# Patient Record
Sex: MALE | Race: BLACK OR AFRICAN AMERICAN | NOT HISPANIC OR LATINO | URBAN - METROPOLITAN AREA
[De-identification: names, ages, dates, MRNs, and addresses within clinical notes are randomized per-mention and may not be internally consistent; named-entity substitution may affect disease eponyms.]

---

## 2020-03-27 ENCOUNTER — EMERGENCY (EMERGENCY)
Facility: HOSPITAL | Age: 35
LOS: 1 days | Discharge: HOME | End: 2020-03-27
Payer: OTHER GOVERNMENT

## 2020-03-27 VITALS
SYSTOLIC BLOOD PRESSURE: 121 MMHG | OXYGEN SATURATION: 96 % | HEIGHT: 71 IN | WEIGHT: 218.04 LBS | DIASTOLIC BLOOD PRESSURE: 67 MMHG | RESPIRATION RATE: 20 BRPM | HEART RATE: 74 BPM | TEMPERATURE: 98 F

## 2020-03-27 PROCEDURE — 99284 EMERGENCY DEPT VISIT MOD MDM: CPT

## 2020-03-27 PROCEDURE — 99282 EMERGENCY DEPT VISIT SF MDM: CPT

## 2020-03-27 PROCEDURE — 87635 SARS-COV-2 COVID-19 AMP PRB: CPT

## 2020-03-27 NOTE — ED PROVIDER NOTE - PATIENT PORTAL LINK FT
You can access the FollowMyHealth Patient Portal offered by [facility] by registering at the following website: [email or web address]. By joining [facility]’s FollowMyHealth portal, you will also be able to view your health information using other applications (apps) compatible with our system.

## 2020-03-27 NOTE — ED PROVIDER NOTE - NSFOLLOWUPINSTRUCTIONS_ED_ALL_ED_FT
Today you may or may have not been tested for the COVID-19 virus. You will have to isolate yourself for the next  10 days and then you can come out of isolation as long as you are 3 days without fever (while not taking any medications for fever).    For pain or fever you can take: Tylenol 1000 mg orally every 6 hours as needed. (Maximum 4000 mg in 24 hours).    Stay well-hydrated by drinking plenty of water daily.    ** Come back to the [facility] room if you start having shortness of breathe, if your symptoms are worsening or if you are concerned. Otherwise stay home and isolate yourself.    **If you have friends or family members that have mild symptoms that may be due to the virus tell them to stay home    Stay home: People who are mildly ill with COVID-19 are able to recover at home. Do not leave, except to get medical care. Do not visit public areas.  Stay in touch with your doctor. Call before you get medical care. Be sure to get care if you feel worse or you think it is an emergency.  Avoid public transportation: Avoid using public transportation, ride-sharing, or taxis.  Stay away from others: As much as possible, you should stay in a specific “sick room” and away from other people in your home. Use a separate bathroom, if available.  Limit contact with pets & animals: You should restrict contact with pets and other animals, just like you would around other people.  Although there have not been reports of pets or other animals becoming sick with COVID-19, it is still recommended that people with the virus limit contact with animals until more information is known.  If you are sick: You should wear a facemask when you are around other people and before you enter a healthcare provider’s office.  If you are caring for others: If the person who is sick is not able to wear a facemask (for example, because it causes trouble breathing), then people who live in the home should stay in a different room. When caregivers enter the room of the sick person, they should wear a facemask. Visitors, other than caregivers, are not recommended.  Cover: Cover your mouth and nose with a tissue when you cough or sneeze.  Dispose: Throw used tissues in a lined trash can.  Wash hands: Immediately wash your hands with soap and water for at least 20 seconds. If soap and water are not available, clean your hands with an alcohol-based hand  that contains at least 60% alcohol.  Hand : If soap and water are not available, use an alcohol-based hand  with at least 60% alcohol, covering all surfaces of your hands and rubbing them together until they feel dry.  Soap and water: Soap and water are the best option, especially if hands are visibly dirty.  Avoid touching: Avoid touching your eyes, nose, and mouth with unwashed hands.  Do not share: Do not share dishes, drinking glasses, cups, eating utensils, towels, or bedding with other people in your home.  Wash thoroughly after use: After using these items, wash them thoroughly with soap and water or put in the .  Clean and disinfect: Routinely clean high-touch surfaces in your “sick room” and bathroom. Let someone else clean and disinfect surfaces in common areas, but not your bedroom and bathroom.  If a caregiver or other person needs to clean and disinfect a sick person’s bedroom or bathroom, they should do so on an as-needed basis. The caregiver/other person should wear a mask and wait as long as possible after the sick person has used the bathroom.  High-touch surfaces include phones, remote controls, counters, tabletops, doorknobs, bathroom fixtures, toilets, keyboards, tablets, and bedside tables.  Clean and disinfect areas that may have blood, stool, or body fluids on them.  Seek medical attention, but call first: Seek medical care right away if your illness is worsening (for example, if you have difficulty breathing).  Call your doctor before going in: Before going to the doctor’s office or emergency room, call ahead and tell them your symptoms. They will tell you what to do.  Wear a facemask: If possible, put on a facemask before you enter the building. If you can’t put on a facemask, try to keep a safe distance from other people (at least 6 feet away). This will help protect the people in the office or waiting room.  Follow care instructions from your healthcare provider and local health department: Your local health authorities will give instructions on checking your symptoms and reporting information.  Call 911 if you have a medical emergency: If you have a medical emergency and need to call 911, notify the  that you have or think you might have, COVID-19. If possible, put on a facemask before medical help arrives.

## 2020-03-27 NOTE — ED PROVIDER NOTE - PHYSICAL EXAMINATION
CONSTITUTIONAL: Well appearing, awake, alert, oriented to person, place, time/situation and in no apparent distress. EYES: Clear bilaterally, pupils equal, round and reactive to light. CARDIAC: Normal rate, regular rhythm.  Heart sounds S1, S2. RESPIRATORY: Breath sounds clear and equal bilaterally. GASTROINTESTINAL: Abdomen soft, non-tender, no guarding. NEUROLOGICAL: Alert and oriented, no focal deficits, grossly intact. SKIN: Skin normal color for race, warm, dry and intact. No evidence of rash.

## 2020-03-27 NOTE — ED PROVIDER NOTE - CLINICAL SUMMARY MEDICAL DECISION MAKING FREE TEXT BOX
Well-appearing, vitals stable. O2 sat WNL. No signs of distress. No REYES. Will discharge with isolation precautions and strict return instructions. Questions answered.

## 2020-03-27 NOTE — ED PROVIDER NOTE - OBJECTIVE STATEMENT
35 year-old male, history of asthma, presents with cc cough and SOb x5 days. Denies any chest pain, abdominal pain, urinary symptoms, rash, photophobia, neck stiffness or any other complaints. Works in  and another person tested positive for COVID-19.

## 2020-03-29 LAB — SARS-COV-2 RNA SPEC QL NAA+PROBE: SIGNIFICANT CHANGE UP

## 2020-09-30 PROBLEM — J45.909 UNSPECIFIED ASTHMA, UNCOMPLICATED: Chronic | Status: ACTIVE | Noted: 2020-03-27

## 2020-10-02 ENCOUNTER — APPOINTMENT (OUTPATIENT)
Dept: RADIOLOGY | Facility: CLINIC | Age: 35
End: 2020-10-02
Payer: OTHER GOVERNMENT

## 2020-10-02 ENCOUNTER — OUTPATIENT (OUTPATIENT)
Dept: OUTPATIENT SERVICES | Facility: HOSPITAL | Age: 35
LOS: 1 days | End: 2020-10-02
Payer: COMMERCIAL

## 2020-10-02 DIAGNOSIS — Z00.8 ENCOUNTER FOR OTHER GENERAL EXAMINATION: ICD-10-CM

## 2020-10-02 PROCEDURE — 73630 X-RAY EXAM OF FOOT: CPT

## 2020-10-02 PROCEDURE — 73630 X-RAY EXAM OF FOOT: CPT | Mod: 26,LT

## 2021-07-10 ENCOUNTER — OUTPATIENT (OUTPATIENT)
Dept: OUTPATIENT SERVICES | Facility: HOSPITAL | Age: 36
LOS: 1 days | End: 2021-07-10
Payer: COMMERCIAL

## 2021-07-10 ENCOUNTER — APPOINTMENT (OUTPATIENT)
Dept: CT IMAGING | Facility: IMAGING CENTER | Age: 36
End: 2021-07-10
Payer: COMMERCIAL

## 2021-07-10 DIAGNOSIS — Z00.8 ENCOUNTER FOR OTHER GENERAL EXAMINATION: ICD-10-CM

## 2021-07-10 PROCEDURE — 73700 CT LOWER EXTREMITY W/O DYE: CPT | Mod: 26,RT

## 2021-07-10 PROCEDURE — 76377 3D RENDER W/INTRP POSTPROCES: CPT | Mod: 26

## 2021-07-10 PROCEDURE — 73700 CT LOWER EXTREMITY W/O DYE: CPT

## 2021-07-10 PROCEDURE — 76377 3D RENDER W/INTRP POSTPROCES: CPT

## 2022-01-31 ENCOUNTER — APPOINTMENT (OUTPATIENT)
Dept: RADIOLOGY | Facility: CLINIC | Age: 37
End: 2022-01-31
Payer: COMMERCIAL

## 2022-01-31 ENCOUNTER — OUTPATIENT (OUTPATIENT)
Dept: OUTPATIENT SERVICES | Facility: HOSPITAL | Age: 37
LOS: 1 days | End: 2022-01-31
Payer: COMMERCIAL

## 2022-01-31 DIAGNOSIS — Z00.8 ENCOUNTER FOR OTHER GENERAL EXAMINATION: ICD-10-CM

## 2022-01-31 PROCEDURE — 73110 X-RAY EXAM OF WRIST: CPT

## 2022-01-31 PROCEDURE — 73110 X-RAY EXAM OF WRIST: CPT | Mod: 26,RT
